# Patient Record
Sex: MALE | Race: WHITE | HISPANIC OR LATINO | URBAN - METROPOLITAN AREA
[De-identification: names, ages, dates, MRNs, and addresses within clinical notes are randomized per-mention and may not be internally consistent; named-entity substitution may affect disease eponyms.]

---

## 2018-03-28 ENCOUNTER — OFFICE VISIT (OUTPATIENT)
Dept: OPHTHALMOLOGY | Facility: CLINIC | Age: 33
End: 2018-03-28
Payer: COMMERCIAL

## 2018-03-28 DIAGNOSIS — H18.603 KERATOCONUS OF BOTH EYES: Primary | ICD-10-CM

## 2018-03-28 PROCEDURE — 99999 PR PBB SHADOW E&M-NEW PATIENT-LVL II: CPT | Mod: PBBFAC,,, | Performed by: OPHTHALMOLOGY

## 2018-03-28 PROCEDURE — 92004 COMPRE OPH EXAM NEW PT 1/>: CPT | Mod: S$GLB,,, | Performed by: OPHTHALMOLOGY

## 2018-03-28 NOTE — PROGRESS NOTES
HPI     Pt states he has had blurred VA OS and this is the 3rd office visit.  He   has seen a doctor at [facility] and Dr. White/Diana.  Denies any eye   surgery.  Denies wearing contacts.  Bothered by high beams at night.     Sees 2 things and a shadow at times. It stays all the time.  It feels   exhausted and overwhelmed on that eye, to the point he doesn't want to   look at anything.         Last edited by Nina Roth on 3/28/2018  2:53 PM. (History)            Assessment /Plan     For exam results, see Encounter Report.    Keratoconus of both eyes  -     Collagen Cross-Linking      The diagnosis of corneal ectasia and its etiology and clinical course were discussed.  Treatment with the use of specialty contact lenses, collagen cross linking, and corneal transplantation was explained in detail.  Repeat Ken/Pentacam in 2-3 mos, and plan on KXL OS then OD  Early KCN OU

## 2018-06-12 ENCOUNTER — OFFICE VISIT (OUTPATIENT)
Dept: OPHTHALMOLOGY | Facility: CLINIC | Age: 33
End: 2018-06-12
Attending: OPHTHALMOLOGY
Payer: COMMERCIAL

## 2018-06-12 DIAGNOSIS — H18.603 KERATOCONUS OF BOTH EYES: Primary | ICD-10-CM

## 2018-06-12 PROCEDURE — 92014 COMPRE OPH EXAM EST PT 1/>: CPT | Mod: S$GLB,,, | Performed by: OPHTHALMOLOGY

## 2018-06-12 PROCEDURE — 99999 PR PBB SHADOW E&M-EST. PATIENT-LVL II: CPT | Mod: PBBFAC,,, | Performed by: OPHTHALMOLOGY

## 2018-06-12 NOTE — PROGRESS NOTES
HPI     Patient here for a follow up with Keratoconus. Patient states his vision   is blurry more in left than right eye. Patient states his left eye always   feels tired.    Eye meds:  OTC AT's PRN OU    Last edited by Corina Diaz on 6/12/2018  4:15 PM. (History)            Assessment /Plan     For exam results, see Encounter Report.    Keratoconus of both eyes        The diagnosis of keratoconus and its etiology and clinical course were discussed.  Treatment with the use of specialty contact lenses, collagen cross linking, and corneal transplantation was explained in detail.    This patient exhibits signs of progression of keratoconus and failure of conservative treatments with spectacles and/or contact lenses.   Disease progression is indicated by   - An increase of >1D in the Kmax  - An increase of >1D of astigmatism in the MRx  - An increase in myopia of >0.50D on MRx    I recommend treatment with Collagen Crosslinking using the Avedro FDA approved epi-off protocol with Photrexa and the KXL System, in order to stabilize this condition.    Plan:   KXL treatment OS, then OD  /495    I have informed the patient that we will seek insurance pre-approval, but in case of failure of the insurance company to cover the cost of this medically necessary procedure, there may be a cost of $2,000 for the patient.

## 2018-08-14 ENCOUNTER — TELEPHONE (OUTPATIENT)
Dept: OPHTHALMOLOGY | Facility: CLINIC | Age: 33
End: 2018-08-14

## 2018-08-14 NOTE — TELEPHONE ENCOUNTER
Let VM to let pt know where to go on Thursday and to make sure he picks up his eye gtts and start using them today. SDF

## 2018-08-16 ENCOUNTER — CLINICAL SUPPORT (OUTPATIENT)
Dept: OPHTHALMOLOGY | Facility: CLINIC | Age: 33
End: 2018-08-16
Attending: OPHTHALMOLOGY

## 2018-08-16 DIAGNOSIS — H18.603 KERATOCONUS OF BOTH EYES: Primary | ICD-10-CM

## 2018-08-16 PROCEDURE — 92012 INTRM OPH EXAM EST PATIENT: CPT | Mod: S$GLB,,, | Performed by: OPHTHALMOLOGY

## 2018-08-16 PROCEDURE — 0402T COLGN CRS-LINK CRN&PACHYMTRY: CPT | Mod: S$GLB,,, | Performed by: OPHTHALMOLOGY

## 2018-08-16 PROCEDURE — 99999 PR PBB SHADOW E&M-EST. PATIENT-LVL II: CPT | Mod: PBBFAC,,,

## 2018-08-16 RX ORDER — PREDNISOLONE ACETATE 10 MG/ML
1 SUSPENSION/ DROPS OPHTHALMIC 4 TIMES DAILY
Qty: 5 ML | Refills: 1 | Status: SHIPPED | OUTPATIENT
Start: 2018-08-16 | End: 2019-08-16

## 2018-08-21 ENCOUNTER — OFFICE VISIT (OUTPATIENT)
Dept: OPHTHALMOLOGY | Facility: CLINIC | Age: 33
End: 2018-08-21
Attending: OPHTHALMOLOGY
Payer: COMMERCIAL

## 2018-08-21 DIAGNOSIS — Z98.890 POST-OPERATIVE STATE: Primary | ICD-10-CM

## 2018-08-21 PROCEDURE — 99024 POSTOP FOLLOW-UP VISIT: CPT | Mod: S$GLB,,, | Performed by: OPHTHALMOLOGY

## 2018-08-21 PROCEDURE — 99999 PR PBB SHADOW E&M-EST. PATIENT-LVL II: CPT | Mod: PBBFAC,,, | Performed by: OPHTHALMOLOGY

## 2018-08-21 RX ORDER — OFLOXACIN 3 MG/ML
1 SOLUTION/ DROPS OPHTHALMIC 4 TIMES DAILY
COMMUNITY
End: 2018-09-25 | Stop reason: ALTCHOICE

## 2018-08-21 NOTE — PROGRESS NOTES
HPI     POD 5 CXL OS 08/16/2018    Patient states he is doing well. Denies pain    Eye meds:  PF QID OS  Ofloxacin QID OS    Last edited by Corina Diaz on 8/21/2018 10:27 AM. (History)            Assessment /Plan     For exam results, see Encounter Report.    Post-operative state      POW1 KXL OS  BCL removed today.  Epi healed completely.  No signs of infection.  Ok to d/c abx. Cont Pred bid for 1 month.  FU 1 month.

## 2018-08-23 NOTE — PROGRESS NOTES
Assessment /Plan     For exam results, see Encounter Report.    Keratoconus of both eyes      SURGEON:  Imani Sarabia M.D.    PREOPERATIVE DIAGNOSIS: Keratoconus    POSTOPERATIVE DIAGNOSIS:  keratoconus    PROCEDURES:    Collagen Crosslinking Left eye    ANESTHESIA: Topical Tetracaine 0.5%    COMPLICATIONS:  None    ESTIMATED BLOOD LOSS: None    SPECIMENS: None    INDICATIONS:  The patient has a history a progressive corneal ectasia.  During the initial consultation, a thorough discussion regarding of the risks, benefits, and alternatives to this procedure was undertaken.  Risks were listed on the consent form and were reviewed with emphasis on the remote possibility of infection, inflammation, scarring, and progression of ectasia - all of which can potentially lead to loss of vision.  Common side effects from the procedure, including pain, dry eye, glare, and haloes, were also explained, as well as defining realistic expectations of stabilizing the disease but not decreasing the need for glasses or contact lenses.  The patient voices understanding of these risks and side effects and communicates realistic expectations from the procedure.     DESCRIPTION OF PROCEDURE:  The patient was admitted to the [facility] at [facility] where the operative eye and procedure were verified, vital signs were taken, and pre-operatively 5-10mg of oral diazepam and drops of Zymar and Pred Forte were administered.  The patient was brought into the LASER suite and positioned on the bed.  A central 9mm epithelial debridement was achieved with the Amls epithelial scrubber, and the initial riboflavin drops administered. A 30 minute induction with drops every 2 minutes was followed by pachymetry. When corneal pachy is less than 400um, hypotonic riboflavin drops are used to thicken the cornea to a minimum of 400um. Next, a 30 min UV light treatment, centered on the cornea was delivered. Antibiotics and a bandage  contact lens were placed.  The patient was discharged with care instructions and a follow up appointment in the eye clinic.

## 2018-09-25 ENCOUNTER — OFFICE VISIT (OUTPATIENT)
Dept: OPHTHALMOLOGY | Facility: CLINIC | Age: 33
End: 2018-09-25
Attending: OPHTHALMOLOGY
Payer: COMMERCIAL

## 2018-09-25 DIAGNOSIS — Z98.890 POST-OPERATIVE STATE: Primary | ICD-10-CM

## 2018-09-25 DIAGNOSIS — H18.603 KERATOCONUS OF BOTH EYES: ICD-10-CM

## 2018-09-25 PROCEDURE — 99024 POSTOP FOLLOW-UP VISIT: CPT | Mod: S$GLB,,, | Performed by: OPHTHALMOLOGY

## 2018-09-25 PROCEDURE — 99999 PR PBB SHADOW E&M-EST. PATIENT-LVL II: CPT | Mod: PBBFAC,,, | Performed by: OPHTHALMOLOGY

## 2018-09-25 NOTE — PROGRESS NOTES
HPI     1 month PO  CXL OS 08/16/2018    Patient states that vision is still blurry, but he is doing well. Denies   pain,     Eye meds:  PredF BID OS      Last edited by Herminia Rome MA on 9/25/2018 11:18 AM. (History)            Assessment /Plan     For exam results, see Encounter Report.    Post-operative state    Keratoconus of both eyes      1 month s/p KXL Tx OS  Cornea healed with expected haze from Tx and 3+ SPK.  Tears, d/c PF

## 2020-05-22 ENCOUNTER — HOSPITAL ENCOUNTER (EMERGENCY)
Facility: HOSPITAL | Age: 35
Discharge: HOME | End: 2020-05-23
Attending: EMERGENCY MEDICINE
Payer: MEDICAID

## 2020-05-22 VITALS
DIASTOLIC BLOOD PRESSURE: 93 MMHG | TEMPERATURE: 99 F | OXYGEN SATURATION: 99 % | RESPIRATION RATE: 16 BRPM | HEART RATE: 64 BPM | SYSTOLIC BLOOD PRESSURE: 154 MMHG

## 2020-05-22 DIAGNOSIS — S62.524A CLOSED NONDISPLACED FRACTURE OF DISTAL PHALANX OF RIGHT THUMB, INITIAL ENCOUNTER: Primary | ICD-10-CM

## 2020-05-22 PROCEDURE — 99284 EMERGENCY DEPT VISIT MOD MDM: CPT | Mod: 57,,, | Performed by: PHYSICIAN ASSISTANT

## 2020-05-22 PROCEDURE — 29130 APPL FINGER SPLINT STATIC: CPT | Mod: F5

## 2020-05-22 PROCEDURE — 26750 PR CLOSE RX DIST FINGR FX: ICD-10-PCS | Mod: 54,F5,, | Performed by: PHYSICIAN ASSISTANT

## 2020-05-22 PROCEDURE — 99283 EMERGENCY DEPT VISIT LOW MDM: CPT | Mod: 25

## 2020-05-22 PROCEDURE — 99284 PR EMERGENCY DEPT VISIT,LEVEL IV: ICD-10-PCS | Mod: 57,,, | Performed by: PHYSICIAN ASSISTANT

## 2020-05-22 PROCEDURE — 25000003 PHARM REV CODE 250: Performed by: PHYSICIAN ASSISTANT

## 2020-05-22 PROCEDURE — 26750 TREAT FINGER FRACTURE EACH: CPT | Mod: 54,F5,, | Performed by: PHYSICIAN ASSISTANT

## 2020-05-22 RX ORDER — ACETAMINOPHEN 325 MG/1
650 TABLET ORAL
Status: COMPLETED | OUTPATIENT
Start: 2020-05-22 | End: 2020-05-22

## 2020-05-22 RX ADMIN — ACETAMINOPHEN 650 MG: 325 TABLET ORAL at 10:05

## 2020-05-22 NOTE — LETTER
Fax Transmission                                                                                                                                                       Date: May 23, 2020       To:  [facility] - Ortho From: [facility]              Graciela Deisi Oh PA-C   Fax:   Fax:    Phone:   Phone:      Special Instructions:     This patient has Subacute fracture of the proximal portion of the distal phalanx of the 1st digit with articular extension and minimal displacement. Sustained on 5/1/2020. Came to [facility] on 5/23/2020. Placed in thumb spica. Please evaluate.                           IF THERE ARE ANY PROBLEMS WITH THIS TRANSMISSION, PLEASE CALL IMMEDIATELY. THANK YOU    CONFIDENTIALITY NOTICE: The accompanying facsimile is intended solely for the use of the recipient designated above. Document(s) transmitted herewith may contain information that is confidential and privileged. Delivery, distribution of dissemination of this communication other than to the intended recipient is strictly prohibited. If you are another healthcare provider and have received this facsimile in error, please properly dispose and notify the sender. If you are NOT a healthcare provider and have received this facsimile in error, please notify [facility]s Compliance & Privacy Department immediately by email at [email or web address]

## 2020-05-23 NOTE — DISCHARGE INSTRUCTIONS
Imaging Results               X-Ray Finger 2 or More Views Right (Final result)  Result time 05/22/20 22:54:52      Final result by Scott Knowles MD (05/22/20 22:54:52)                   Impression:      Subacute fracture of the proximal portion of the distal phalanx of the 1st digit with articular extension and minimal displacement.  Recommend follow-up.    This report was flagged in Epic as abnormal.      Electronically signed by: Scott Knowles  Date:    05/22/2020  Time:    22:54               Narrative:    EXAMINATION:  XR FINGER 2 OR MORE VIEWS RIGHT    CLINICAL HISTORY:  thumb fracture on 5/01. here with worse pain and paresthesias;    TECHNIQUE:  Three views of the right thumb.    COMPARISON:  None    FINDINGS:  Subacute fracture of the proximal portion of the distal phalanx of the 1st digit.  The fracture extends to the articular surface.  6 mm fragment with minimal displacement.    Recommend orthopedic follow-up if symptoms persist.    No other fractures.  No foreign body.    Mild degenerative changes of the metacarpophalangeal joint

## 2020-05-23 NOTE — ED PROVIDER NOTES
Encounter Date: 5/22/2020       History     Chief Complaint   Patient presents with    Arm Pain     pt had a splint placed to right arm for fractured thumb on May 1st. Pt c/o numbness/tingling and pain to right thumb x 3 weeks. Able to move fingers; cap refill < 3 sec     Patient is a 35-year-old male that presents the ED with right thumb pain.  He states on 05/01/2020 he sustained a thumb fracture.  He was seen at [facility] and placed in a thumb spica splint.  He has not followed up with Orthopedics yet because he states he does got insurance.  He is here for worsening pain and paresthesias.  Also interested in a referral to Orthopedics here.  He complains of 10/10 pain to his thumb.  He had Advil earlier today without resolution of his pain.  He is right-hand dominant.        Review of patient's allergies indicates:  No Known Allergies  No past medical history on file.  Past Surgical History:   Procedure Laterality Date    COLLAGEN CROSS LINKING Left 08/16/2018    DR. MIKE    HERNIA REPAIR       No family history on file.  Social History     Tobacco Use    Smoking status: Former Smoker    Smokeless tobacco: Never Used   Substance Use Topics    Alcohol use: Yes    Drug use: Not on file     Review of Systems   Constitutional: Negative for diaphoresis and fever.   HENT: Negative for sore throat.    Respiratory: Negative for shortness of breath.    Cardiovascular: Negative for chest pain.   Gastrointestinal: Negative for nausea.   Genitourinary: Negative for dysuria.   Musculoskeletal: Positive for arthralgias. Negative for back pain.   Skin: Negative for rash.   Neurological: Positive for numbness. Negative for weakness.   Hematological: Does not bruise/bleed easily.       Physical Exam     Initial Vitals [05/22/20 2203]   BP Pulse Resp Temp SpO2   (!) 154/93 64 16 99.3 °F (37.4 °C) 99 %      MAP       --         Physical Exam    Vitals reviewed.  Constitutional: He appears well-developed and  well-nourished. He is not diaphoretic.  Non-toxic appearance. He does not have a sickly appearance. He does not appear ill. No distress. Face mask in place.   HENT:   Head: Normocephalic and atraumatic.   Nose: Nose normal.   Eyes: Conjunctivae and EOM are normal.   Neck: Normal range of motion.   Cardiovascular: Normal rate.   Pulses:       Radial pulses are 2+ on the right side.   Pulmonary/Chest: No respiratory distress.   Abdominal: He exhibits no distension.   Musculoskeletal:        Right hand: He exhibits decreased range of motion and bony tenderness. He exhibits no laceration. Decreased sensation noted. Decreased strength noted.   Patient had thumb spica plaster splint from previous ED in place which I removed for evaluation.    Pain to IP joint of right thumb with ecchymosis. Skin intact. No ROM of his IP joint. Dec sensation to the pad of his thumb.  Remainder of wrist and hand ROM and strength intact.   Neurological: He is alert. A sensory deficit is present.   Skin: Skin is warm and dry. Ecchymosis noted.         ED Course   Orthopedic Injury  Date/Time: 5/22/2020 12:47 AM  Performed by: Graciela Lipscomb PA-C  Authorized by: Eddie Larry MD     Location procedure was performed:  [facility] EMERGENCY DEPARTMENT  Injury:     Injury location:  Finger    Location details:  Right thumb    Injury type:  Fracture-dislocation    Fracture type: distal phalanx      Any IP joint involved?: Yes        Pre-procedure assessment:     Distal perfusion: normal      Neurological function: diminished      Range of motion: reduced        Selections made in this section will also lock the Injury type section above.:     Immobilization:  Splint    Splint type:  Thumb spica    Supplies used: cotton padding, plaster, and ace wrap.    Complications: No        Labs Reviewed - No data to display       Imaging Results           X-Ray Finger 2 or More Views Right (Final result)  Result time 05/22/20 22:54:52    Final result by Scott  MD Benson (05/22/20 22:54:52)                 Impression:      Subacute fracture of the proximal portion of the distal phalanx of the 1st digit with articular extension and minimal displacement.  Recommend follow-up.    This report was flagged in Epic as abnormal.      Electronically signed by: Scott Pineda  Date:    05/22/2020  Time:    22:54             Narrative:    EXAMINATION:  XR FINGER 2 OR MORE VIEWS RIGHT    CLINICAL HISTORY:  thumb fracture on 5/01. here with worse pain and paresthesias;    TECHNIQUE:  Three views of the right thumb.    COMPARISON:  None    FINDINGS:  Subacute fracture of the proximal portion of the distal phalanx of the 1st digit.  The fracture extends to the articular surface.  6 mm fragment with minimal displacement.    Recommend orthopedic follow-up if symptoms persist.    No other fractures.  No foreign body.    Mild degenerative changes of the metacarpophalangeal joint                                 Medical Decision Making:   History:   Old Medical Records: I decided to obtain old medical records.  Old Records Summarized: records from clinic visits and records from previous admission(s).       <> Summary of Records: Patient seen at [facility] on 05/01/2020.  He was prescribed NSAIDs and Norco for pain relief.  Initial Assessment:   Patient is a 35-year-old male that presents the ED with right thumb pain.   Differential Diagnosis:   Includes but is not limited to fractures, dislocations, soft tissue contusion, bony contusion neuropathy.  Skin without signs of infection.  I do not suspect cellulitis or septic arthritis.  Clinical Tests:   Radiological Study: Ordered and Reviewed  ED Management:  Will obtain x-rays, give acetaminophen given that he had Advil prior to arrival, and reassess.    X-ray shows fracture of the proximal portion of the distal phalanx of the 1st digit with articular extension and minimal displacement.    Patient updated with results.  I place patient  back into a new thumb spica plaster splint.  See procedure note.    Splint care provided.  Elevate arm.  Continue NSAIDs for pain relief.  I will fax [facility] so that he can follow-up with orthopedics there.  He was provided with our follow-up information with Hand Surgery as well.  Return to ED precautions given for signs and symptoms as discussed, and he voices understanding.  All questions were answered.  Patient comfortable with plan and stable for discharge.    I have reviewed patient's chart and discussed this case with my supervising MD.     Graciela Lipscomb PA-C  Emergent Department  [facility]  Spectralink #[id number] or #[id number]                                   Clinical Impression:       ICD-10-CM ICD-9-CM   1. Closed nondisplaced fracture of distal phalanx of right thumb, initial encounter S62.524A 816.02         Disposition:   Disposition: Discharged  Condition: Stable     ED Disposition Condition    Discharge Stable        ED Prescriptions     None        Follow-up Information     Follow up With Specialties Details Why Contact [facility] Surgical Oncology, Orthopedic Surgery, Genetics, Physical Medicine and Rehabilitation, Occupational Therapy, Radiology Schedule an appointment as soon as possible for a visit   [address] [facility] [address]  [phone]      [facility] Emergency Medicine  If symptoms worsen [address] [place] [address] [id number] [phone]                                     Graciela Lipscomb PA-C  05/23/20 0048